# Patient Record
Sex: FEMALE | Race: WHITE | NOT HISPANIC OR LATINO | Employment: PART TIME | URBAN - METROPOLITAN AREA
[De-identification: names, ages, dates, MRNs, and addresses within clinical notes are randomized per-mention and may not be internally consistent; named-entity substitution may affect disease eponyms.]

---

## 2018-09-25 ENCOUNTER — THERAPY VISIT (OUTPATIENT)
Dept: PHYSICAL THERAPY | Facility: CLINIC | Age: 63
End: 2018-09-25
Payer: COMMERCIAL

## 2018-09-25 DIAGNOSIS — M54.41 RIGHT-SIDED LOW BACK PAIN WITH RIGHT-SIDED SCIATICA: Primary | ICD-10-CM

## 2018-09-25 PROCEDURE — 97530 THERAPEUTIC ACTIVITIES: CPT | Mod: GP | Performed by: PHYSICAL THERAPIST

## 2018-09-25 PROCEDURE — 97161 PT EVAL LOW COMPLEX 20 MIN: CPT | Mod: GP | Performed by: PHYSICAL THERAPIST

## 2018-09-25 PROCEDURE — 97110 THERAPEUTIC EXERCISES: CPT | Mod: GP | Performed by: PHYSICAL THERAPIST

## 2018-09-25 NOTE — LETTER
[facility] PHYSICAL THERAPY  [address]  [facility] [address]  [phone]    2018    Re: Maribell Holloway   :   1955  MRN:  [id number]   REFERRING PHYSICIAN:   Chris Malloy    [facility] PHYSICAL THERAPY    Date of Initial Evaluation:  2018  Visits:   1  Reason for Referral:  Right-sided low back pain with right-sided sciatica    [facility] Initial Evaluation  Subjective:  Patient is a 62 year old female presenting with rehab back hpi. The history is provided by the patient. No  was used. Maribell Holloway is a 62 year old female with a lumbar condition.  Condition occurred with:  Insidious onset.  Condition occurred: for unknown reasons.  This is a new condition  2-3 weeks.  Patient reports pain:  Lumbar spine right.  Radiates to:  Lower leg right and gluteals right.  Pain is described as burning, aching, sharp, shooting and stabbing and is constant and reported as 8/10.  Associated symptoms:  Fatigue. Pain is worse during the night.  Symptoms are exacerbated by certain positions and sitting and relieved by muscle relaxants.  Since onset symptoms are unchanged. General health as reported by patient is good.  Pertinent medical history includes:  Cancer, heart problems, high blood pressure and overweight.  Medical allergies: yes (peniciliin).  Other surgeries include:  Cancer surgery and heart surgery.                        Objective:    Lumbar/SI Evaluation  ROM:    AROM Lumbar:   Flexion:          50%, +  Ext:                    Wnl _   Side Bend:        Left:   wnl     Right:  +  Rotation:           Left:     Right:   Side Glide:        Left:     Right:      Strength: R hip flex 4/5, L 5/5  Lumbar DTR's:  not assessed  Neural Tension/Mobility:    Left side:Slump  negative.   Right side:   Slump positive.  Lumbar Palpation:    Tenderness present at  Right: Piriformis and Gluteus Medius                  Re: Maribell Holloway   :   1955      Assessment/Plan:    Patient is a 62 year old female with lumbar complaints.    Patient has the following significant findings with corresponding treatment plan.                Diagnosis 1:  Low back with leg pain - R  Pain -  hot/cold therapy and manual therapy  Decreased ROM/flexibility - manual therapy and therapeutic exercise  Decreased strength - therapeutic exercise and therapeutic activities    Therapy Evaluation Codes:   1) History comprised of:   Personal factors that impact the plan of care:      None.    Comorbidity factors that impact the plan of care are:      None.     Medications impacting care: Muscle relaxant and Steroids.  2) Examination of Body Systems comprised of:   Body structures and functions that impact the plan of care:      Lumbar spine.   Activity limitations that impact the plan of care are:      Sitting and Sleeping.  3) Clinical presentation characteristics are:   Stable/Uncomplicated.  4) Decision-Making    Low complexity using standardized patient assessment instrument and/or   measureable assessment of functional outcome.  Cumulative Therapy Evaluation is: Low complexity.    Previous and current functional limitations:  (See Goal Flow Sheet for this information)    Short term and Long term goals: (See Goal Flow Sheet for this information)   Communication ability:  Patient appears to be able to clearly communicate and understand verbal and written communication and follow directions correctly.  Treatment Explanation - The following has been discussed with the patient:   RX ordered/plan of care, Anticipated outcomes, Possible risks and side effects    This patient would benefit from PT intervention to resume normal activities.   Rehab potential is good.  Frequency:  1 X week, once daily  Duration:  for 4 weeks  Discharge Plan:  Achieve all LTG.  Independent in home treatment  program.  Reach maximal therapeutic benefit.    Thank you for your referral.    INQUIRIES    Therapist:  Windy Hare PT  [facility] [place] [facility]  [address] [facility] [address]  [place] [address]  Phone: [phone]  Fax: [phone]

## 2018-09-25 NOTE — PROGRESS NOTES
[facility] Initial Evaluation  Subjective:  Patient is a 62 year old female presenting with rehab back hpi. The history is provided by the patient. No  was used.   Maribell Holloway is a 62 year old female with a lumbar condition.  Condition occurred with:  Insidious onset.  Condition occurred: for unknown reasons.  This is a new condition  2-3 weeks.    Patient reports pain:  Lumbar spine right.  Radiates to:  Lower leg right and gluteals right.  Pain is described as burning, aching, sharp, shooting and stabbing and is constant and reported as 8/10.  Associated symptoms:  Fatigue. Pain is worse during the night.  Symptoms are exacerbated by certain positions and sitting and relieved by muscle relaxants.  Since onset symptoms are unchanged.        General health as reported by patient is good.  Pertinent medical history includes:  Cancer, heart problems, high blood pressure and overweight.  Medical allergies: yes (peniciliin).  Other surgeries include:  Cancer surgery and heart surgery.                                        Objective:  System         Lumbar/SI Evaluation  ROM:    AROM Lumbar:   Flexion:          50%, +  Ext:                    Wnl _   Side Bend:        Left:   wnl     Right:  +  Rotation:           Left:     Right:   Side Glide:        Left:     Right:         Strength: R hip flex 4/5, L 5/5    Lumbar DTR's:  not assessed          Neural Tension/Mobility:      Left side:Slump  negative.   Right side:   Slump positive.  Lumbar Palpation:      Tenderness present at Right: Piriformis and Gluteus Medius                                                     General     ROS    Assessment/Plan:    Patient is a 62 year old female with lumbar complaints.    Patient has the following significant findings with corresponding treatment plan.                Diagnosis 1:  Low back with leg pain - R  Pain -  hot/cold therapy and manual therapy  Decreased ROM/flexibility -  manual therapy and therapeutic exercise  Decreased strength - therapeutic exercise and therapeutic activities    Therapy Evaluation Codes:   1) History comprised of:   Personal factors that impact the plan of care:      None.    Comorbidity factors that impact the plan of care are:      None.     Medications impacting care: Muscle relaxant and Steroids.  2) Examination of Body Systems comprised of:   Body structures and functions that impact the plan of care:      Lumbar spine.   Activity limitations that impact the plan of care are:      Sitting and Sleeping.  3) Clinical presentation characteristics are:   Stable/Uncomplicated.  4) Decision-Making    Low complexity using standardized patient assessment instrument and/or measureable assessment of functional outcome.  Cumulative Therapy Evaluation is: Low complexity.    Previous and current functional limitations:  (See Goal Flow Sheet for this information)    Short term and Long term goals: (See Goal Flow Sheet for this information)     Communication ability:  Patient appears to be able to clearly communicate and understand verbal and written communication and follow directions correctly.  Treatment Explanation - The following has been discussed with the patient:   RX ordered/plan of care  Anticipated outcomes  Possible risks and side effects  This patient would benefit from PT intervention to resume normal activities.   Rehab potential is good.    Frequency:  1 X week, once daily  Duration:  for 4 weeks  Discharge Plan:  Achieve all LTG.  Independent in home treatment program.  Reach maximal therapeutic benefit.    Please refer to the daily flowsheet for treatment today, total treatment time and time spent performing 1:1 timed codes.

## 2018-10-02 ENCOUNTER — THERAPY VISIT (OUTPATIENT)
Dept: PHYSICAL THERAPY | Facility: CLINIC | Age: 63
End: 2018-10-02
Payer: COMMERCIAL

## 2018-10-02 DIAGNOSIS — M54.41 RIGHT-SIDED LOW BACK PAIN WITH RIGHT-SIDED SCIATICA: ICD-10-CM

## 2018-10-02 PROCEDURE — 97530 THERAPEUTIC ACTIVITIES: CPT | Mod: GP | Performed by: PHYSICAL THERAPIST

## 2018-10-02 PROCEDURE — 97110 THERAPEUTIC EXERCISES: CPT | Mod: GP | Performed by: PHYSICAL THERAPIST

## 2019-08-12 ENCOUNTER — RECORDS - HEALTHEAST (OUTPATIENT)
Dept: LAB | Facility: CLINIC | Age: 64
End: 2019-08-12

## 2019-08-12 LAB
ANION GAP SERPL CALCULATED.3IONS-SCNC: 9 MMOL/L (ref 5–18)
BUN SERPL-MCNC: 12 MG/DL (ref 8–22)
CALCIUM SERPL-MCNC: 8.8 MG/DL (ref 8.5–10.5)
CHLORIDE BLD-SCNC: 98 MMOL/L (ref 98–107)
CO2 SERPL-SCNC: 28 MMOL/L (ref 22–31)
CREAT SERPL-MCNC: 0.79 MG/DL (ref 0.6–1.1)
ERYTHROCYTE [DISTWIDTH] IN BLOOD BY AUTOMATED COUNT: 14.9 % (ref 11–14.5)
GFR SERPL CREATININE-BSD FRML MDRD: >60 ML/MIN/1.73M2
GLUCOSE BLD-MCNC: 88 MG/DL (ref 70–125)
HCT VFR BLD AUTO: 25.5 % (ref 35–47)
HGB BLD-MCNC: 8 G/DL (ref 12–16)
MCH RBC QN AUTO: 29.6 PG (ref 27–34)
MCHC RBC AUTO-ENTMCNC: 31.4 G/DL (ref 32–36)
MCV RBC AUTO: 94 FL (ref 80–100)
PLATELET # BLD AUTO: 427 THOU/UL (ref 140–440)
PMV BLD AUTO: 9.1 FL (ref 8.5–12.5)
POTASSIUM BLD-SCNC: 4 MMOL/L (ref 3.5–5)
RBC # BLD AUTO: 2.7 MILL/UL (ref 3.8–5.4)
SODIUM SERPL-SCNC: 135 MMOL/L (ref 136–145)
WBC: 13.2 THOU/UL (ref 4–11)

## 2019-08-15 ENCOUNTER — RECORDS - HEALTHEAST (OUTPATIENT)
Dept: LAB | Facility: CLINIC | Age: 64
End: 2019-08-15

## 2019-08-16 LAB
ANION GAP SERPL CALCULATED.3IONS-SCNC: 7 MMOL/L (ref 5–18)
BUN SERPL-MCNC: 10 MG/DL (ref 8–22)
CALCIUM SERPL-MCNC: 8.8 MG/DL (ref 8.5–10.5)
CHLORIDE BLD-SCNC: 93 MMOL/L (ref 98–107)
CO2 SERPL-SCNC: 32 MMOL/L (ref 22–31)
CREAT SERPL-MCNC: 0.83 MG/DL (ref 0.6–1.1)
ERYTHROCYTE [DISTWIDTH] IN BLOOD BY AUTOMATED COUNT: 14.9 % (ref 11–14.5)
GFR SERPL CREATININE-BSD FRML MDRD: >60 ML/MIN/1.73M2
GLUCOSE BLD-MCNC: 99 MG/DL (ref 70–125)
HCT VFR BLD AUTO: 26.4 % (ref 35–47)
HGB BLD-MCNC: 8 G/DL (ref 12–16)
MCH RBC QN AUTO: 29.3 PG (ref 27–34)
MCHC RBC AUTO-ENTMCNC: 30.3 G/DL (ref 32–36)
MCV RBC AUTO: 97 FL (ref 80–100)
PLATELET # BLD AUTO: 503 THOU/UL (ref 140–440)
PMV BLD AUTO: 9.1 FL (ref 8.5–12.5)
POTASSIUM BLD-SCNC: 3.7 MMOL/L (ref 3.5–5)
RBC # BLD AUTO: 2.73 MILL/UL (ref 3.8–5.4)
SODIUM SERPL-SCNC: 132 MMOL/L (ref 136–145)
WBC: 9.3 THOU/UL (ref 4–11)

## 2022-09-06 ENCOUNTER — TRANSCRIBE ORDERS (OUTPATIENT)
Dept: OTHER | Age: 67
End: 2022-09-06

## 2022-09-06 DIAGNOSIS — M25.511 RIGHT SHOULDER PAIN: ICD-10-CM

## 2022-09-06 DIAGNOSIS — Z02.6 ENCOUNTER RELATED TO WORKER'S COMPENSATION CLAIM: Primary | ICD-10-CM

## 2022-09-28 ENCOUNTER — THERAPY VISIT (OUTPATIENT)
Dept: PHYSICAL THERAPY | Facility: CLINIC | Age: 67
End: 2022-09-28
Attending: STUDENT IN AN ORGANIZED HEALTH CARE EDUCATION/TRAINING PROGRAM
Payer: OTHER MISCELLANEOUS

## 2022-09-28 DIAGNOSIS — M25.511 RIGHT SHOULDER PAIN: Primary | ICD-10-CM

## 2022-09-28 PROCEDURE — 97161 PT EVAL LOW COMPLEX 20 MIN: CPT | Mod: GP | Performed by: PHYSICAL THERAPIST

## 2022-09-28 PROCEDURE — 97112 NEUROMUSCULAR REEDUCATION: CPT | Mod: GP | Performed by: PHYSICAL THERAPIST

## 2022-09-28 PROCEDURE — 97110 THERAPEUTIC EXERCISES: CPT | Mod: GP | Performed by: PHYSICAL THERAPIST

## 2022-09-28 NOTE — PROGRESS NOTES
[facility] Initial Evaluation -- Upper Extremity    Evaluation Date: September 28, 2022  Maribell Holloway is a 66 year old female with a R shld condition.   Referral: FP  Work mechanical stresses: [employer] - standing, reaching, pushing, pulling, twisting ties for bread  Employment status:  Off work  Leisure mechanical stresses: not a regular exerciser  Functional disability score (SPADI): See Flowsheet  VAS score (0-10): 4-5/10, ranges 0-9/10  Handedness (R/L):  R  Patient goals/expectations:  Be able to use arm for ADL's and work w/o pain    HISTORY    Present symptoms: Ant lat upper arm and more recently now pain in R UT and Neck.    Pain quality (sharp/shooting/stabbing/aching/burning/cramping):  Sharp, aching    Present since (onset date):  Aug 17- 2022    Symptoms (improving/unchanging/worsening):  Improved some since cortisone injection.    Symptoms commenced as a result of: pulling cart of rolls/bread out of freezer and felt snap in shld area   Condition occurred in the following environment: at work    Symptoms at onset: ant /lat/post shld and lat/ant upper arm  Paresthesia (yes/no):  no  Spinal history: none   Cough/Sneeze (pos/neg):  neg    Constant symptoms:   Intermittent symptoms: R Upper arm and UT/Neck    Symptoms are worse with the following: Always Dressing 6-7/10, Always Reaching OH, BB, out to the side and across body, Always Sleeping (prone/sup/side R/L) - L SL now and Time of day - Always as the day progresses   Symptoms are better with the following: Other - CBD oil and not moving the arm    Continued use makes the pain (better/worse/no effect): worse    Disturbed night (yes/no):  Yes - occas w/ lying on R shld    Pain at rest (yes/no): no  Site (neck/shoulder/elbow/wrist/hand):     Other questions (swelling/catching/clicking/locking/subluxing):  Clicking and catching    Previous episodes: none  Previous treatments: Cortisone inj ~3 wks ago - some decr in pain -  pain is more localized in the  Upper arm and less in the shld    Specific Questions:  General health (excellent/good/fair/poor):  good  Pertinent medical history includes: Cancer, Heart problems, High blood pressure and Overweight  Medications (nil/NSAIDS/analg/steroids/anticoag/other):  Other - High blood pressure  Medical allergies:  PCN  Imaging (None/Xray/MRI/Other): MRI - negative  Recent or major surgery (yes/no): Cancer - uterine 5yrs ago, heart- stint 30 yrs ago  Night pain (yes/no): no  Accidents (yes/no): no  Unexplained weight loss (yes/no): no  Barriers at home: none  Other red flags: none    Sites for physical examination (neck/shoulder/elbow/wrist/hand): R shld and neck    EXAMINATION    Posture:  Sitting (good/fair/poor): poor  Correction of posture (better/worse/no effect/NA): better  Standing (good/fair/poor): fair  Other observations:  Pt is avoiding moving R arm.    Neurological (NA/motor/sensory/reflexes/dural): NA    Baselines (pain or functional activity): Pain R Upper arm w/ any arm movements    Extremities (Shoulder/Elbow/Wrist/Hand): R shld    Movement Loss Yaw Mod Min Nil Pain   Flexion X    ~90 +++   Extension        Abduction X    ~45 +++   Internal Rotation        External Rotation           Passive Movement (+/- overpressure)/(PDM/ERP):  NA d/t time  Resisted Test Response (pain): NA d/t limited ROM/pain  Other Tests:     Spine:  Movement Loss:    Protr  WNL   Flex WNL    Retr  minomod decr   Pain R neck   Ext Mod decr R upper arm pain   SB R Mod decr         L Mod decr - pain R upper arm   Rot R WNL          L Min decr  Effect of repeated movements:    Seated Retraction x10 - Prod R neck to top of R shld, W, NE ROM shld or neck   SB R - 2 x10 - NE, B - Less pain Upper arm and neck, decr pain w/ Cerv ROM and shld ROM  Effect of static positioning: NA  Spine testing (not relevant/relevant/secondary problem): Relevant    Baseline Symptoms: NA - will clear Cervical spine then reassess R  shld  Repeated Tests Symptom Response Mechanical Response   Active/Passive movement, resisted test, functional test During - Produce, Abolish, Increase, Decrease, NE After -   Better, Worse, NB, NW, NE Effect -   ? or ? ROM, strength or key functional test No Effect          Effect of static positioning                  Provisional Classification (Extremity/Spine): Extremity - Inconclusive/Other - Needs further assessment - Cerv Derangement vs shld derangement      Principle of Management:  Education:  Posture - Neutral Spine, use of L-roll, affect of posture on spine/pain, no couch, recliner, or propping up on pillows in bed, specificity of exer, centralisation/peripheralisation, spine as a source of extremity pain and HEP    Equipment provided:  None - Recommended using rolled towel for L-Roll whenever sitting.    Exercise and dosage:  Seated Cerv SB R x10 6 x/day    ASSESSMENT/PLAN:    Patient is a 66 year old female with right side shoulder complaints.    Patient has the following significant findings with corresponding treatment plan.                Diagnosis 1:  R shoulder pain w/ apparent cervical component  Pain -  hot/cold therapy, manual therapy, self management, education, directional preference exercise and home program  Decreased ROM/flexibility - manual therapy, therapeutic exercise and home program  Decreased strength - therapeutic exercise, therapeutic activities and home program  Decreased function - therapeutic activities and home program  Impaired posture - neuro re-education and home program    Therapy Evaluation Codes:   1) History comprised of:   Personal factors that impact the plan of care:      None.    Comorbidity factors that impact the plan of care are:      Cancer, Heart problems, High blood pressure and Overweight.     Medications impacting care: High blood pressure.  2) Examination of Body Systems comprised of:   Body structures and functions that impact the plan of care:      Cervical  spine and Shoulder.   Activity limitations that impact the plan of care are:      Bathing, Cooking, Driving, Dressing, Lifting, Working, Sleeping and Reaching.  3) Clinical presentation characteristics are:   Stable/Uncomplicated.  4) Decision-Making    Low complexity using standardized patient assessment instrument and/or measureable assessment of functional outcome.  Cumulative Therapy Evaluation is: Low complexity.    Previous and current functional limitations:  (See Goal Flow Sheet for this information)    Short term and Long term goals: (See Goal Flow Sheet for this information)     Communication ability:  Patient appears to be able to clearly communicate and understand verbal and written communication and follow directions correctly.  Treatment Explanation - The following has been discussed with the patient:   RX ordered/plan of care  Anticipated outcomes  Possible risks and side effects  This patient would benefit from PT intervention to resume normal activities.   Rehab potential is good.    Frequency:  1 X week, once daily  Duration:  for 6 weeks  Discharge Plan:  Achieve all LTG.  Independent in home treatment program.  Reach maximal therapeutic benefit.    Please refer to the daily flowsheet for treatment today, total treatment time and time spent performing 1:1 timed codes.

## 2022-09-28 NOTE — LETTER
[facility]  [address] [place] [address]  [address]   [place] [address]  [phone]    2022    Re: Maribell Holloway   :   1955  MRN:  [id number]   REFERRING PHYSICIAN:   Chris GREEN [facility] [place]    Date of Initial Evaluation:  2022  Visits:    1  Reason for Referral:  Right shoulder pain    [facility] Initial Evaluation -- Upper Extremity  Evaluation Date: 2022  Maribell Holloway is a 66 year old female with a R shld condition.   Referral: FP  Work mechanical stresses: [employer] - standing, reaching, pushing, pulling, twisting ties for bread  Employment status:  Off work  Leisure mechanical stresses: not a regular exerciser  Functional disability score (SPADI): See Flowsheet  VAS score (0-10): 4-5/10, ranges 0-9/10  Handedness (R/L):  R  Patient goals/expectations:  Be able to use arm for ADL's and work w/o pain    HISTORY  Present symptoms: Ant lat upper arm and more recently now pain in R UT and Neck.    Pain quality (sharp/shooting/stabbing/aching/burning/cramping):  Sharp, aching  Present since (onset date):  Aug 17-     Symptoms (improving/unchanging/worsening):  Improved some since cortisone injection.  Symptoms commenced as a result of: pulling cart of rolls/bread out of freezer and felt snap in shld area   Condition occurred in the following environment: at work  Symptoms at onset: ant /lat/post shld and lat/ant upper arm    Paresthesia (yes/no):  no  Spinal history: none     Cough/Sneeze (pos/neg):  neg  Constant symptoms:   Intermittent symptoms: R Upper arm and UT/Neck  Symptoms are worse with the following: Always Dressing 6-7/10, Always Reaching OH, BB, out to the side and across body, Always Sleeping (prone/sup/side R/L) - L SL now and Time of day - Always as the day progresses   Symptoms are better with the following: Other -  CBD oil and not moving the arm  Continued use makes the pain (better/worse/no effect): worse    Re: Maribell Holloway   :   1955    HISTORY (continued)  Disturbed night (yes/no):  Yes - occas w/ lying on R shld  Pain at rest (yes/no): no    Site (neck/shoulder/elbow/wrist/hand):   Other questions (swelling/catching/clicking/locking/subluxing):  Clicking and catching  Previous episodes: none  Previous treatments: Cortisone inj ~3 wks ago - some decr in pain - pain is more localized in the  Upper arm and less in the shld    Specific Questions:  General health (excellent/good/fair/poor):  good  Pertinent medical history includes: Cancer, Heart problems, High blood pressure and Overweight  Medications (nil/NSAIDS/analg/steroids/anticoag/other):  Other - High blood pressure  Medical allergies:  PCN  Imaging (None/Xray/MRI/Other): MRI - negative  Recent or major surgery (yes/no): Cancer - uterine 5yrs ago, heart- stint 30 yrs ago  Night pain (yes/no): no  Accidents (yes/no): no  Unexplained weight loss (yes/no): no  Barriers at home: none  Other red flags: none  Sites for physical examination (neck/shoulder/elbow/wrist/hand): R shld and neck    EXAMINATION    Posture:  Sitting (good/fair/poor): poor  Correction of posture (better/worse/no effect/NA): better  Standing (good/fair/poor): fair  Other observations:  Pt is avoiding moving R arm.  Neurological (NA/motor/sensory/reflexes/dural): NA  Baselines (pain or functional activity): Pain R Upper arm w/ any arm movements  Extremities (Shoulder/Elbow/Wrist/Hand): R shld  Movement Loss Yaw Mod Min Nil Pain   Flexion X    ~90 +++   Extension        Abduction X    ~45 +++   Internal Rotation        External Rotation           Passive Movement (+/- overpressure)/(PDM/ERP):  NA d/t time  Resisted Test Response (pain): NA d/t limited ROM/pain  Other Tests:     Re: Maribell Holloway   :   1955    Spine:  Movement Loss:    Protr  WNL   Flex WNL    Retr  minomod  decr   Pain R neck   Ext Mod decr R upper arm pain   SB R Mod decr         L Mod decr - pain R upper arm   Rot R WNL          L Min decr  Effect of repeated movements:    Seated Retraction x10 - Prod R neck to top of R shld, W, NE ROM shld or neck   SB R - 2 x10 - NE, B - Less pain Upper arm and neck, decr pain w/ Cerv ROM   and shld ROM  Effect of static positioning: NA  Spine testing (not relevant/relevant/secondary problem): Relevant  Baseline Symptoms: NA - will clear Cervical spine then reassess R shld  Repeated Tests Symptom Response Mechanical Response   Active/Passive movement, resisted test, functional test During - Produce, Abolish, Increase, Decrease, NE After -   Better, Worse, NB, NW, NE Effect -   ? or ? ROM, strength or key functional test No Effect   Effect of static positioning       Provisional Classification (Extremity/Spine): Extremity - Inconclusive/Other - Needs further assessment - Cerv Derangement vs shld derangement    Principle of Management:  Education:  Posture - Neutral Spine, use of L-roll, affect of posture on spine/pain, no couch, recliner, or propping up on pillows in bed, specificity of exer, centralisation/peripheralisation, spine as a source of extremity pain and HEP  Equipment provided:  None - Recommended using rolled towel for L-Roll whenever sitting.  Exercise and dosage:  Seated Cerv SB R x10 6 x/day    ASSESSMENT/PLAN:  Patient is a 66 year old female with right side shoulder complaints.    Patient has the following significant findings with corresponding treatment plan.                Diagnosis 1:  R shoulder pain w/ apparent cervical component  Pain -  hot/cold therapy, manual therapy, self management, education, directional preference exercise and home program  Decreased ROM/flexibility - manual therapy, therapeutic exercise and home program  Decreased strength - therapeutic exercise, therapeutic activities and home program  Decreased function - therapeutic activities and  home program  Impaired posture - neuro re-education and home program            Re: Maribell Holloway   :   1955    Therapy Evaluation Codes:   1) History comprised of:   Personal factors that impact the plan of care:      None.    Comorbidity factors that impact the plan of care are:      Cancer, Heart problems, High blood pressure and Overweight.     Medications impacting care: High blood pressure.  2) Examination of Body Systems comprised of:   Body structures and functions that impact the plan of care:      Cervical spine and Shoulder.   Activity limitations that impact the plan of care are:      Bathing, Cooking, Driving, Dressing, Lifting, Working, Sleeping and   Reaching.  3) Clinical presentation characteristics are:   Stable/Uncomplicated.  4) Decision-Making    Low complexity using standardized patient assessment instrument and/or   measureable assessment of functional outcome.  Cumulative Therapy Evaluation is: Low complexity.    Previous and current functional limitations:  (See Goal Flow Sheet for this information)    Short term and Long term goals: (See Goal Flow Sheet for this information)   Communication ability:  Patient appears to be able to clearly communicate and understand verbal and written communication and follow directions correctly.  Treatment Explanation - The following has been discussed with the patient:   RX ordered/plan of care, Anticipated outcomes, Possible risks and side effects    This patient would benefit from PT intervention to resume normal activities.   Rehab potential is good.  Frequency:  1 X week, once daily  Duration:  for 6 weeks  Discharge Plan:  Achieve all LTG.  Independent in home treatment program.  Reach maximal therapeutic benefit.    Thank you for your referral.    INQUIRIES        Therapist: Mey Vital, PT, Cert. MDT  [address]  [address]  [facility] [address]  Phone:  [phone]  Fax: [phone]

## 2022-10-05 ENCOUNTER — THERAPY VISIT (OUTPATIENT)
Dept: PHYSICAL THERAPY | Facility: CLINIC | Age: 67
End: 2022-10-05
Payer: OTHER MISCELLANEOUS

## 2022-10-05 DIAGNOSIS — M25.511 RIGHT SHOULDER PAIN: Primary | ICD-10-CM

## 2022-10-05 PROCEDURE — 97110 THERAPEUTIC EXERCISES: CPT | Mod: GP | Performed by: PHYSICAL THERAPIST

## 2022-10-05 PROCEDURE — 97530 THERAPEUTIC ACTIVITIES: CPT | Mod: GP | Performed by: PHYSICAL THERAPIST

## 2022-10-05 NOTE — PROGRESS NOTES
PTRx/HEP:  Exercise Name: Posture Correction with Lumbar Roll, Notes: When you are sitting use L-support  Avoid couch or recliner  no propping up in bed, EMR Notes:  Posture - Neutral Spine, use of L-roll, affect of posture on spine/pain, no couch, recliner, or propping up on pillows in bed    Exercise Name: Cervical Retraction, Sets: 1 - Reps: 10 reps - Sessions: 4-5 x/day , Notes: KEEP CHIN DOWN  BRING HEAD BACK  USE L HAND TO PUSH ON CHIN TO INCREASE NECK STRETCH, EMR Notes: 2 x10 - prod Neck, NW neck, Incr ROM w/ decr pain R shld     Exercise Name: Cervical ROM Side Bending, EMR Notes: SB R -  x10 - NE, NE, slight decr pain neck to R shld w/ rot L

## 2022-10-12 ENCOUNTER — THERAPY VISIT (OUTPATIENT)
Dept: PHYSICAL THERAPY | Facility: CLINIC | Age: 67
End: 2022-10-12
Payer: OTHER MISCELLANEOUS

## 2022-10-12 DIAGNOSIS — M25.511 RIGHT SHOULDER PAIN: Primary | ICD-10-CM

## 2022-10-12 PROCEDURE — 97530 THERAPEUTIC ACTIVITIES: CPT | Mod: GP | Performed by: PHYSICAL THERAPIST

## 2022-10-12 PROCEDURE — 97110 THERAPEUTIC EXERCISES: CPT | Mod: GP | Performed by: PHYSICAL THERAPIST

## 2022-10-12 PROCEDURE — 97112 NEUROMUSCULAR REEDUCATION: CPT | Mod: GP | Performed by: PHYSICAL THERAPIST

## 2022-10-27 ENCOUNTER — THERAPY VISIT (OUTPATIENT)
Dept: PHYSICAL THERAPY | Facility: CLINIC | Age: 67
End: 2022-10-27
Payer: OTHER MISCELLANEOUS

## 2022-10-27 DIAGNOSIS — M25.511 RIGHT SHOULDER PAIN: Primary | ICD-10-CM

## 2022-10-27 PROCEDURE — 97112 NEUROMUSCULAR REEDUCATION: CPT | Mod: GP | Performed by: PHYSICAL THERAPIST

## 2022-10-27 PROCEDURE — 97140 MANUAL THERAPY 1/> REGIONS: CPT | Mod: GP | Performed by: PHYSICAL THERAPIST

## 2022-10-27 PROCEDURE — 97530 THERAPEUTIC ACTIVITIES: CPT | Mod: GP | Performed by: PHYSICAL THERAPIST

## 2022-10-27 PROCEDURE — 97110 THERAPEUTIC EXERCISES: CPT | Mod: GP | Performed by: PHYSICAL THERAPIST

## 2022-11-02 ENCOUNTER — THERAPY VISIT (OUTPATIENT)
Dept: PHYSICAL THERAPY | Facility: CLINIC | Age: 67
End: 2022-11-02
Payer: OTHER MISCELLANEOUS

## 2022-11-02 DIAGNOSIS — M25.511 RIGHT SHOULDER PAIN: Primary | ICD-10-CM

## 2022-11-02 PROCEDURE — 97530 THERAPEUTIC ACTIVITIES: CPT | Mod: GP | Performed by: PHYSICAL THERAPIST

## 2022-11-02 PROCEDURE — 97140 MANUAL THERAPY 1/> REGIONS: CPT | Mod: GP | Performed by: PHYSICAL THERAPIST

## 2022-11-02 PROCEDURE — 97110 THERAPEUTIC EXERCISES: CPT | Mod: GP | Performed by: PHYSICAL THERAPIST

## 2022-11-10 ENCOUNTER — THERAPY VISIT (OUTPATIENT)
Dept: PHYSICAL THERAPY | Facility: CLINIC | Age: 67
End: 2022-11-10
Payer: OTHER MISCELLANEOUS

## 2022-11-10 DIAGNOSIS — M25.511 RIGHT SHOULDER PAIN: Primary | ICD-10-CM

## 2022-11-10 PROCEDURE — 97112 NEUROMUSCULAR REEDUCATION: CPT | Mod: GP | Performed by: PHYSICAL THERAPIST

## 2022-11-10 PROCEDURE — 97110 THERAPEUTIC EXERCISES: CPT | Mod: GP | Performed by: PHYSICAL THERAPIST

## 2022-11-10 PROCEDURE — 97140 MANUAL THERAPY 1/> REGIONS: CPT | Mod: GP | Performed by: PHYSICAL THERAPIST

## 2022-11-17 ENCOUNTER — THERAPY VISIT (OUTPATIENT)
Dept: PHYSICAL THERAPY | Facility: CLINIC | Age: 67
End: 2022-11-17
Payer: OTHER MISCELLANEOUS

## 2022-11-17 DIAGNOSIS — M25.511 RIGHT SHOULDER PAIN: Primary | ICD-10-CM

## 2022-11-17 PROCEDURE — 97530 THERAPEUTIC ACTIVITIES: CPT | Mod: GP | Performed by: PHYSICAL THERAPIST

## 2022-11-17 PROCEDURE — 97112 NEUROMUSCULAR REEDUCATION: CPT | Mod: GP | Performed by: PHYSICAL THERAPIST

## 2022-11-17 PROCEDURE — 97110 THERAPEUTIC EXERCISES: CPT | Mod: GP | Performed by: PHYSICAL THERAPIST

## 2022-11-17 NOTE — LETTER
[facility] [place]  [address] [place] [address]  [address]  [place] [address]  [phone]    2022    Re: Maribell Holloway   :   1955  MRN:  [id number]   REFERRING PHYSICIAN:   Chris GREEN [facility] [place]    Date of Initial Evaluation:  2022  Visits:   7  Reason for Referral:  Right shoulder pain    PROGRESS  REPORT:  Progress reporting period is from 2022 to 2022.    SUBJECTIVE  Subjective changes noted by patient: Was doing well then started strengthening last visit and had increased pain R ant lat upper arm - stopped doing the strengthening exer. Shld started feeling better.  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    Current Pain level: 2/10 (ranges 0-3/10).     Initial Pain level: 5/10 (ranges 0-9/10).   Changes in function:  Yes (See Goal flowsheet attached for changes in current functional level)  Adverse reaction to treatment or activity: treatment -Incr pain with strengthening exer, activity - Incr pain at work past 2 days - Prolonged looking down to roll wraps    OBJECTIVE  Currently Mild ant lat upper arm.     Cerv AROM:  Pro WNL     Flex WNL,     Retr Min decr,     Ext Mod Decr,     SB R Min decr,     SB L Mod-max Decr - R neck and UT stretch,     Rot R WNL,     Rot L Min Decr -tight.         Shld AROM:  Flex ~150(+),     Abd ~140(+),     IR(BB) to L1 (+),     Horiz Add min decr  R shld Strength:  Flexion 5-/5 (+)        Abd  4+/5 (+)        Biceps  5/5        Triceps 5/5      Re: Maribell GREEN [place]   :   1955    ASSESSMENT/PLAN  Updated problem list and treatment plan:   Diagnosis 1:  R shoulder pain w/ apparent cervical component  Pain -  hot/cold therapy, manual therapy, self management, education, directional preference exercise and home  program  Decreased ROM/flexibility - manual therapy, therapeutic exercise and home program  Decreased strength - therapeutic exercise, therapeutic activities and home program  Decreased function - therapeutic activities and home program  Impaired posture - neuro re-education and home program  STG/LTGs have been met or progress has been made towards goals:  Yes (See Goal flow sheet completed today.)  Assessment of Progress: The patient's condition is improving.  Self Management Plans:  Patient has been instructed in a home treatment program.  Patient  has been instructed in self management of symptoms.  I have re-evaluated this patient and find that the nature, scope, duration and intensity of the therapy is appropriate for the medical condition of the patient.  Maribell continues to require the following intervention to meet STG and LTG's:  PT    Recommendations:  This patient would benefit from continued therapy.     Frequency:  1 X week, once daily  Duration:  for 6 weeks to progress UB and shld strengthening exer    Thank you for your referral.    INQUIRIES        Therapist: Mey Vital, PT, Cert. JACOB   [address]  [address]  [facility] [address]  Phone: [phone]  Fax: [phone]

## 2022-11-17 NOTE — PROGRESS NOTES
PROGRESS  REPORT    Progress reporting period is from 09- to 11-.       SUBJECTIVE  Subjective changes noted by patient: Was doing well then started strengthening last visit and had increased pain R ant lat upper arm - stopped doing the strengthening exer. Shld started feeling better.  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    Current Pain level: 2/10 (ranges 0-3/10).     Initial Pain level: 5/10 (ranges 0-9/10).   Changes in function:  Yes (See Goal flowsheet attached for changes in current functional level)  Adverse reaction to treatment or activity: treatment -Incr pain with strengthening exer, activity - Incr pain at work past 2 days - Prolonged looking down to roll wraps    OBJECTIVE  Currently Mild ant lat upper arm.     Cerv AROM:  Pro WNL     Flex WNL,     Retr Min decr,     Ext Mod Decr,     SB R Min decr,     SB L Mod-max Decr - R neck and UT stretch,     Rot R WNL,     Rot L Min Decr -tight.         Shld AROM:  Flex ~150(+),     Abd ~140(+),     IR(BB) to L1 (+),     Horiz Add min decr  R shld Strength:  Flexion 5-/5 (+)        Abd  4+/5 (+)        Biceps  5/5        Triceps 5/5      ASSESSMENT/PLAN  Updated problem list and treatment plan:   Diagnosis 1:  R shoulder pain w/ apparent cervical component  Pain -  hot/cold therapy, manual therapy, self management, education, directional preference exercise and home program  Decreased ROM/flexibility - manual therapy, therapeutic exercise and home program  Decreased strength - therapeutic exercise, therapeutic activities and home program  Decreased function - therapeutic activities and home program  Impaired posture - neuro re-education and home program  STG/LTGs have been met or progress has been made towards goals:  Yes (See Goal flow sheet completed today.)  Assessment of Progress: The patient's condition is  improving.  Self Management Plans:  Patient has been instructed in a home treatment program.  Patient  has been instructed in self management of symptoms.  I have re-evaluated this patient and find that the nature, scope, duration and intensity of the therapy is appropriate for the medical condition of the patient.  Maribell continues to require the following intervention to meet STG and LTG's:  PT    Recommendations:  This patient would benefit from continued therapy.     Frequency:  1 X week, once daily  Duration:  for 6 weeks to progress UB and shld strengthening exer

## 2022-11-21 ENCOUNTER — TRANSCRIBE ORDERS (OUTPATIENT)
Dept: OTHER | Age: 67
End: 2022-11-21

## 2022-12-01 ENCOUNTER — THERAPY VISIT (OUTPATIENT)
Dept: PHYSICAL THERAPY | Facility: CLINIC | Age: 67
End: 2022-12-01
Payer: OTHER MISCELLANEOUS

## 2022-12-01 DIAGNOSIS — M25.511 RIGHT SHOULDER PAIN: Primary | ICD-10-CM

## 2022-12-01 PROCEDURE — 97110 THERAPEUTIC EXERCISES: CPT | Mod: GP | Performed by: PHYSICAL THERAPIST

## 2022-12-01 PROCEDURE — 97530 THERAPEUTIC ACTIVITIES: CPT | Mod: GP | Performed by: PHYSICAL THERAPIST

## 2022-12-01 PROCEDURE — 97112 NEUROMUSCULAR REEDUCATION: CPT | Mod: GP | Performed by: PHYSICAL THERAPIST

## 2022-12-29 ENCOUNTER — THERAPY VISIT (OUTPATIENT)
Dept: PHYSICAL THERAPY | Facility: CLINIC | Age: 67
End: 2022-12-29
Payer: OTHER MISCELLANEOUS

## 2022-12-29 DIAGNOSIS — M25.511 ACUTE PAIN OF RIGHT SHOULDER: Primary | ICD-10-CM

## 2022-12-29 PROCEDURE — 97110 THERAPEUTIC EXERCISES: CPT | Mod: GP | Performed by: PHYSICAL THERAPIST

## 2022-12-29 PROCEDURE — 97530 THERAPEUTIC ACTIVITIES: CPT | Mod: GP | Performed by: PHYSICAL THERAPIST

## 2023-01-18 ENCOUNTER — THERAPY VISIT (OUTPATIENT)
Dept: PHYSICAL THERAPY | Facility: CLINIC | Age: 68
End: 2023-01-18
Payer: OTHER MISCELLANEOUS

## 2023-01-18 DIAGNOSIS — M25.511 ACUTE PAIN OF RIGHT SHOULDER: Primary | ICD-10-CM

## 2023-01-18 PROCEDURE — 97110 THERAPEUTIC EXERCISES: CPT | Mod: GP | Performed by: PHYSICAL THERAPIST

## 2023-01-18 PROCEDURE — 97140 MANUAL THERAPY 1/> REGIONS: CPT | Mod: GP | Performed by: PHYSICAL THERAPIST

## 2023-01-18 PROCEDURE — 97530 THERAPEUTIC ACTIVITIES: CPT | Mod: GP | Performed by: PHYSICAL THERAPIST

## 2023-01-18 NOTE — LETTER
[facility]  [address] [place] [address]  [address]   [place] [address]  [phone]    2023    Re: Maribell Holloway   :   1955  MRN:  [id number]   REFERRING PHYSICIAN:   Chris GREEN [facility] [place]  Date of Initial Evaluation: 2022  Visits:  Rxs Used: 10  Reason for Referral:  Acute pain of right shoulder    DISCHARGE REPORT: Progress reporting period is from 2022 to 2023.  Pt has been seen 10x since  2022.    SUBJECTIVE  Subjective changes noted by patient:  Overall R shld and arm are doing well.  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  Feels she would tolerate 100-150# carts, but not full carts 300-400#.    Current Pain level: 0/10 (3/10 last week after work).     Initial Pain level: 5/10 (ranges 0-9/10).   Changes in function:  Yes (See Goal flowsheet attached for changes in current functional level)  Adverse reaction to treatment or activity: activity - pushing full cart at work (300-400#)    OBJECTIVE  Currently no pain.    Cerv Arom:  Flex WNL, Retr WNL, Ext min decr, SB R Slight decr, SB L min-mod decr, Rot R &L WNL.    R Shld AROM: Flex slight decr, Abd WNL, ER (Behind Head) R=L, IR (Behind Back) R to T11/ L to T6.    Strength R Shld Flex, Abd, IR, ER, Biceps, Triceps 5/5 throughout w/o pain.      ASSESSMENT/PLAN  Updated problem list and treatment plan:   Diagnosis 1:  R shoulder pain w/ apparent cervical component  Decreased ROM/flexibility - home program  Decreased function - home program  STG/LTGs have been met or progress has been made towards goals:  Yes (See Goal flow sheet completed today.)  Assessment of Progress: The patient has met all of their long term goals.  Self Management Plans:  Patient is independent in a home  treatment program.  Patient is independent in self management of symptoms.  Maribell continues to require the following intervention to meet STG and LTG's:  PT intervention is no longer required to meet STG/LTG.    Re: Maribell GREEN [place]   :   1955    Recommendations:  This patient is ready to be discharged from therapy and continue their home treatment program.    Thank you for your referral.    INQUIRIES    Therapist: Mey Vital PT, Cert. JACOB  [address]  [facility] [address]  Phone: [phone]  Fax: [phone]